# Patient Record
Sex: FEMALE | Race: OTHER | NOT HISPANIC OR LATINO | URBAN - METROPOLITAN AREA
[De-identification: names, ages, dates, MRNs, and addresses within clinical notes are randomized per-mention and may not be internally consistent; named-entity substitution may affect disease eponyms.]

---

## 2018-07-13 ENCOUNTER — OUTPATIENT (OUTPATIENT)
Dept: OUTPATIENT SERVICES | Age: 23
LOS: 1 days | Discharge: HOME | End: 2018-07-13

## 2018-07-13 PROBLEM — Z00.00 ENCOUNTER FOR PREVENTIVE HEALTH EXAMINATION: Status: ACTIVE | Noted: 2018-07-13

## 2018-07-16 ENCOUNTER — APPOINTMENT (OUTPATIENT)
Dept: PEDIATRIC CARDIOLOGY | Facility: CLINIC | Age: 23
End: 2018-07-16
Payer: COMMERCIAL

## 2018-07-16 PROCEDURE — 76820 UMBILICAL ARTERY ECHO: CPT

## 2018-07-16 PROCEDURE — 76825 ECHO EXAM OF FETAL HEART: CPT

## 2018-07-16 PROCEDURE — 99201 OFFICE OUTPATIENT NEW 10 MINUTES: CPT | Mod: 25

## 2018-07-16 PROCEDURE — 93325 DOPPLER ECHO COLOR FLOW MAPG: CPT | Mod: 59

## 2018-07-16 PROCEDURE — 76827 ECHO EXAM OF FETAL HEART: CPT

## 2018-07-16 PROCEDURE — 76821 MIDDLE CEREBRAL ARTERY ECHO: CPT

## 2018-08-01 ENCOUNTER — OUTPATIENT (OUTPATIENT)
Dept: OUTPATIENT SERVICES | Facility: HOSPITAL | Age: 23
LOS: 1 days | End: 2018-08-01

## 2018-08-01 DIAGNOSIS — Z3A.00 WEEKS OF GESTATION OF PREGNANCY NOT SPECIFIED: ICD-10-CM

## 2018-08-01 DIAGNOSIS — O26.899 OTHER SPECIFIED PREGNANCY RELATED CONDITIONS, UNSPECIFIED TRIMESTER: ICD-10-CM

## 2019-04-27 ENCOUNTER — RESULT REVIEW (OUTPATIENT)
Age: 24
End: 2019-04-27

## 2021-10-06 ENCOUNTER — APPOINTMENT (OUTPATIENT)
Dept: ORTHOPEDIC SURGERY | Facility: CLINIC | Age: 26
End: 2021-10-06
Payer: MEDICAID

## 2021-10-06 ENCOUNTER — NON-APPOINTMENT (OUTPATIENT)
Age: 26
End: 2021-10-06

## 2021-10-06 DIAGNOSIS — M17.0 BILATERAL PRIMARY OSTEOARTHRITIS OF KNEE: ICD-10-CM

## 2021-10-06 DIAGNOSIS — M22.41 CHONDROMALACIA PATELLAE, RIGHT KNEE: ICD-10-CM

## 2021-10-06 DIAGNOSIS — M22.42 CHONDROMALACIA PATELLAE, LEFT KNEE: ICD-10-CM

## 2021-10-06 PROCEDURE — 99204 OFFICE O/P NEW MOD 45 MIN: CPT

## 2021-10-06 PROCEDURE — 73564 X-RAY EXAM KNEE 4 OR MORE: CPT | Mod: LT

## 2021-10-06 NOTE — HISTORY OF PRESENT ILLNESS
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  Typically experiences pain when gets out of bed in the morning and standing for more than 30 minutes or sitting for long periods of time.  Reports associated "cracking a lot" denies swelling or locking.  Medical history of hypothyroidism for which patient is on medication

## 2021-10-06 NOTE — PHYSICAL EXAM
[de-identified] : Constitutional:Well nourished , well developed and in no acute distress\par Psychiatric: Alert and oriented to time place and person.Appropriate affect \par Skin:Head, neck, arms and lower extremities:no lesions or discoloration\par HEENT: Normocephalic, EOM intact, Nasal septum midline,\par Respiratory: Unlabored respirations,no audible wheezing ,no tachypnea, no cyanosis\par Cardiovascular: no leg swelling  no ankle edema no JVD, pulse regular\par Vascular: no calf or thigh tenderness, \par Peripheral pulses; intact\par Lymphatics:No groin adenopathy,no lymphedema lower  or upper extremities\par Right and left knee borderline increased Q angle no effusion motion full crepitus ligaments intact tender left knee medial patellar facet [de-identified] : X-rays 4 views right and left knee reveal borderline patella hiral

## 2023-02-07 ENCOUNTER — OUTPATIENT (OUTPATIENT)
Dept: OUTPATIENT SERVICES | Facility: HOSPITAL | Age: 28
LOS: 1 days | End: 2023-02-07
Payer: MEDICAID

## 2023-02-07 DIAGNOSIS — O26.613 LIVER AND BILIARY TRACT DISORDERS IN PREGNANCY, THIRD TRIMESTER: ICD-10-CM

## 2023-02-07 LAB
ANION GAP SERPL CALC-SCNC: 9 MMOL/L — SIGNIFICANT CHANGE UP (ref 5–17)
APTT BLD: 29 SEC — SIGNIFICANT CHANGE UP (ref 27.5–35.5)
BLD GP AB SCN SERPL QL: SIGNIFICANT CHANGE UP
BUN SERPL-MCNC: 11 MG/DL — SIGNIFICANT CHANGE UP (ref 7–18)
CALCIUM SERPL-MCNC: 9 MG/DL — SIGNIFICANT CHANGE UP (ref 8.4–10.5)
CHLORIDE SERPL-SCNC: 104 MMOL/L — SIGNIFICANT CHANGE UP (ref 96–108)
CO2 SERPL-SCNC: 21 MMOL/L — LOW (ref 22–31)
CREAT SERPL-MCNC: 0.64 MG/DL — SIGNIFICANT CHANGE UP (ref 0.5–1.3)
EGFR: 123 ML/MIN/1.73M2 — SIGNIFICANT CHANGE UP
GLUCOSE SERPL-MCNC: 75 MG/DL — SIGNIFICANT CHANGE UP (ref 70–99)
HCT VFR BLD CALC: 35.2 % — SIGNIFICANT CHANGE UP (ref 34.5–45)
HGB BLD-MCNC: 11.3 G/DL — LOW (ref 11.5–15.5)
INR BLD: 1.12 RATIO — SIGNIFICANT CHANGE UP (ref 0.88–1.16)
MCHC RBC-ENTMCNC: 26.2 PG — LOW (ref 27–34)
MCHC RBC-ENTMCNC: 32.1 GM/DL — SIGNIFICANT CHANGE UP (ref 32–36)
MCV RBC AUTO: 81.7 FL — SIGNIFICANT CHANGE UP (ref 80–100)
NRBC # BLD: 0 /100 WBCS — SIGNIFICANT CHANGE UP (ref 0–0)
PLATELET # BLD AUTO: 252 K/UL — SIGNIFICANT CHANGE UP (ref 150–400)
POTASSIUM SERPL-MCNC: 3.9 MMOL/L — SIGNIFICANT CHANGE UP (ref 3.5–5.3)
POTASSIUM SERPL-SCNC: 3.9 MMOL/L — SIGNIFICANT CHANGE UP (ref 3.5–5.3)
PROTHROM AB SERPL-ACNC: 13.4 SEC — SIGNIFICANT CHANGE UP (ref 10.5–13.4)
RBC # BLD: 4.31 M/UL — SIGNIFICANT CHANGE UP (ref 3.8–5.2)
RBC # FLD: 14.3 % — SIGNIFICANT CHANGE UP (ref 10.3–14.5)
SARS-COV-2 RNA SPEC QL NAA+PROBE: SIGNIFICANT CHANGE UP
SODIUM SERPL-SCNC: 134 MMOL/L — LOW (ref 135–145)
WBC # BLD: 6.76 K/UL — SIGNIFICANT CHANGE UP (ref 3.8–10.5)
WBC # FLD AUTO: 6.76 K/UL — SIGNIFICANT CHANGE UP (ref 3.8–10.5)

## 2023-02-08 ENCOUNTER — TRANSCRIPTION ENCOUNTER (OUTPATIENT)
Age: 28
End: 2023-02-08

## 2023-02-08 LAB — T PALLIDUM AB TITR SER: NEGATIVE — SIGNIFICANT CHANGE UP

## 2023-02-09 ENCOUNTER — INPATIENT (INPATIENT)
Facility: HOSPITAL | Age: 28
LOS: 2 days | Discharge: HOME | End: 2023-02-12
Attending: OBSTETRICS & GYNECOLOGY | Admitting: OBSTETRICS & GYNECOLOGY
Payer: MEDICAID

## 2023-02-09 ENCOUNTER — RESULT REVIEW (OUTPATIENT)
Age: 28
End: 2023-02-09

## 2023-02-09 VITALS — HEIGHT: 68 IN | WEIGHT: 203.93 LBS

## 2023-02-09 DIAGNOSIS — O26.613 LIVER AND BILIARY TRACT DISORDERS IN PREGNANCY, THIRD TRIMESTER: ICD-10-CM

## 2023-02-09 LAB
HBV SURFACE AG SERPL QL IA: SIGNIFICANT CHANGE UP
HIV 1 & 2 AB SERPL IA.RAPID: SIGNIFICANT CHANGE UP
HIV 1+2 AB+HIV1 P24 AG SERPL QL IA: SIGNIFICANT CHANGE UP

## 2023-02-09 PROCEDURE — 88307 TISSUE EXAM BY PATHOLOGIST: CPT | Mod: 26

## 2023-02-09 RX ORDER — FERROUS SULFATE 325(65) MG
325 TABLET ORAL DAILY
Refills: 0 | Status: DISCONTINUED | OUTPATIENT
Start: 2023-02-09 | End: 2023-02-12

## 2023-02-09 RX ORDER — SODIUM CHLORIDE 9 MG/ML
1000 INJECTION, SOLUTION INTRAVENOUS
Refills: 0 | Status: DISCONTINUED | OUTPATIENT
Start: 2023-02-09 | End: 2023-02-09

## 2023-02-09 RX ORDER — CEFAZOLIN SODIUM 1 G
2000 VIAL (EA) INJECTION ONCE
Refills: 0 | Status: COMPLETED | OUTPATIENT
Start: 2023-02-09 | End: 2023-02-09

## 2023-02-09 RX ORDER — OXYTOCIN 10 UNIT/ML
333.33 VIAL (ML) INJECTION
Qty: 20 | Refills: 0 | Status: DISCONTINUED | OUTPATIENT
Start: 2023-02-09 | End: 2023-02-12

## 2023-02-09 RX ORDER — ACETAMINOPHEN 500 MG
1000 TABLET ORAL ONCE
Refills: 0 | Status: DISCONTINUED | OUTPATIENT
Start: 2023-02-09 | End: 2023-02-12

## 2023-02-09 RX ORDER — METOCLOPRAMIDE HCL 10 MG
10 TABLET ORAL ONCE
Refills: 0 | Status: DISCONTINUED | OUTPATIENT
Start: 2023-02-09 | End: 2023-02-09

## 2023-02-09 RX ORDER — DIPHENHYDRAMINE HCL 50 MG
25 CAPSULE ORAL EVERY 6 HOURS
Refills: 0 | Status: DISCONTINUED | OUTPATIENT
Start: 2023-02-09 | End: 2023-02-12

## 2023-02-09 RX ORDER — FAMOTIDINE 10 MG/ML
20 INJECTION INTRAVENOUS ONCE
Refills: 0 | Status: COMPLETED | OUTPATIENT
Start: 2023-02-09 | End: 2023-02-09

## 2023-02-09 RX ORDER — MAGNESIUM HYDROXIDE 400 MG/1
30 TABLET, CHEWABLE ORAL
Refills: 0 | Status: DISCONTINUED | OUTPATIENT
Start: 2023-02-09 | End: 2023-02-12

## 2023-02-09 RX ORDER — SODIUM CHLORIDE 9 MG/ML
1000 INJECTION, SOLUTION INTRAVENOUS
Refills: 0 | Status: DISCONTINUED | OUTPATIENT
Start: 2023-02-09 | End: 2023-02-11

## 2023-02-09 RX ORDER — LANOLIN
1 OINTMENT (GRAM) TOPICAL EVERY 6 HOURS
Refills: 0 | Status: DISCONTINUED | OUTPATIENT
Start: 2023-02-09 | End: 2023-02-12

## 2023-02-09 RX ORDER — HEPARIN SODIUM 5000 [USP'U]/ML
5000 INJECTION INTRAVENOUS; SUBCUTANEOUS EVERY 12 HOURS
Refills: 0 | Status: DISCONTINUED | OUTPATIENT
Start: 2023-02-10 | End: 2023-02-12

## 2023-02-09 RX ORDER — ONDANSETRON 8 MG/1
4 TABLET, FILM COATED ORAL EVERY 6 HOURS
Refills: 0 | Status: DISCONTINUED | OUTPATIENT
Start: 2023-02-09 | End: 2023-02-09

## 2023-02-09 RX ORDER — CALCIUM CARBONATE 500(1250)
3 TABLET ORAL EVERY 6 HOURS
Refills: 0 | Status: DISCONTINUED | OUTPATIENT
Start: 2023-02-09 | End: 2023-02-09

## 2023-02-09 RX ORDER — KETOROLAC TROMETHAMINE 30 MG/ML
30 SYRINGE (ML) INJECTION EVERY 6 HOURS
Refills: 0 | Status: DISCONTINUED | OUTPATIENT
Start: 2023-02-09 | End: 2023-02-11

## 2023-02-09 RX ORDER — TETANUS TOXOID, REDUCED DIPHTHERIA TOXOID AND ACELLULAR PERTUSSIS VACCINE, ADSORBED 5; 2.5; 8; 8; 2.5 [IU]/.5ML; [IU]/.5ML; UG/.5ML; UG/.5ML; UG/.5ML
0.5 SUSPENSION INTRAMUSCULAR ONCE
Refills: 0 | Status: DISCONTINUED | OUTPATIENT
Start: 2023-02-09 | End: 2023-02-12

## 2023-02-09 RX ORDER — IBUPROFEN 200 MG
600 TABLET ORAL EVERY 6 HOURS
Refills: 0 | Status: COMPLETED | OUTPATIENT
Start: 2023-02-09 | End: 2024-01-08

## 2023-02-09 RX ORDER — CITRIC ACID/SODIUM CITRATE 300-500 MG
30 SOLUTION, ORAL ORAL ONCE
Refills: 0 | Status: COMPLETED | OUTPATIENT
Start: 2023-02-09 | End: 2023-02-09

## 2023-02-09 RX ORDER — SENNA PLUS 8.6 MG/1
2 TABLET ORAL AT BEDTIME
Refills: 0 | Status: DISCONTINUED | OUTPATIENT
Start: 2023-02-09 | End: 2023-02-09

## 2023-02-09 RX ORDER — MORPHINE SULFATE 50 MG/1
0.2 CAPSULE, EXTENDED RELEASE ORAL ONCE
Refills: 0 | Status: DISCONTINUED | OUTPATIENT
Start: 2023-02-09 | End: 2023-02-12

## 2023-02-09 RX ORDER — ACETAMINOPHEN 500 MG
975 TABLET ORAL
Refills: 0 | Status: DISCONTINUED | OUTPATIENT
Start: 2023-02-09 | End: 2023-02-12

## 2023-02-09 RX ORDER — SIMETHICONE 80 MG/1
80 TABLET, CHEWABLE ORAL EVERY 6 HOURS
Refills: 0 | Status: DISCONTINUED | OUTPATIENT
Start: 2023-02-09 | End: 2023-02-12

## 2023-02-09 RX ORDER — SODIUM CHLORIDE 9 MG/ML
1000 INJECTION, SOLUTION INTRAVENOUS ONCE
Refills: 0 | Status: COMPLETED | OUTPATIENT
Start: 2023-02-09 | End: 2023-02-09

## 2023-02-09 RX ORDER — LEVOTHYROXINE SODIUM 125 MCG
50 TABLET ORAL DAILY
Refills: 0 | Status: DISCONTINUED | OUTPATIENT
Start: 2023-02-10 | End: 2023-02-12

## 2023-02-09 RX ORDER — OXYCODONE HYDROCHLORIDE 5 MG/1
5 TABLET ORAL
Refills: 0 | Status: COMPLETED | OUTPATIENT
Start: 2023-02-09 | End: 2023-02-16

## 2023-02-09 RX ADMIN — Medication 975 MILLIGRAM(S): at 23:30

## 2023-02-09 RX ADMIN — SODIUM CHLORIDE 2000 MILLILITER(S): 9 INJECTION, SOLUTION INTRAVENOUS at 09:59

## 2023-02-09 RX ADMIN — Medication 30 MILLILITER(S): at 10:00

## 2023-02-09 RX ADMIN — Medication 975 MILLIGRAM(S): at 22:27

## 2023-02-09 RX ADMIN — Medication 100 MILLIGRAM(S): at 10:17

## 2023-02-09 RX ADMIN — Medication 1000 MILLIUNIT(S)/MIN: at 17:25

## 2023-02-09 RX ADMIN — Medication 30 MILLIGRAM(S): at 19:16

## 2023-02-09 RX ADMIN — Medication 30 MILLIGRAM(S): at 18:31

## 2023-02-09 RX ADMIN — FAMOTIDINE 20 MILLIGRAM(S): 10 INJECTION INTRAVENOUS at 10:00

## 2023-02-09 NOTE — PROGRESS NOTE ADULT - SUBJECTIVE AND OBJECTIVE BOX
Pt seen at bedside resting comfortably and offers no complaints.  Luna catheter in place draining adequate urine. Pt denies CP,SOB, N/V, dizziness, palpitations or any other complaints.  T(C): 36.9 (02-09-23 @ 20:27), Max: 36.9 (02-09-23 @ 20:27)  HR: 86 (02-09-23 @ 20:27) (74 - 100)  BP: 110/74 (02-09-23 @ 20:27) (97/65 - 121/74)  RR: 18 (02-09-23 @ 20:27) (14 - 20)  SpO2: 97% (02-09-23 @ 20:27) (97% - 100%)    abd: soft/nt, fundus firm, incision C/D/I, dermabond in place   pelvic: minimal lochia  ext: venodynes in place; no calf tenderness             A/p:  28yo POD #0 s/p scheduled repeat c/s @ 37wks for ICP, h/o hypothyroidism, pt stable   - synthroid ordered  -cont close monitor   -cont post op care  - cont luna, monitor ouput   - clear liquid diet, once pt passes gas advance to regular diet   -d/w Dr. Watts

## 2023-02-09 NOTE — PATIENT PROFILE OB - FALL HARM RISK - UNIVERSAL INTERVENTIONS
Bed in lowest position, wheels locked, appropriate side rails in place/Call bell, personal items and telephone in reach/Instruct patient to call for assistance before getting out of bed or chair/Non-slip footwear when patient is out of bed/[place] to call system/Physically safe environment - no spills, clutter or unnecessary equipment/Purposeful Proactive Rounding/Room/bathroom lighting operational, light cord in reach

## 2023-02-09 NOTE — PATIENT PROFILE OB - REASON FOR REFUSAL
Call received from patient's wife Anabelle.  Explained that per RN, due to recent blot clots, patient will be able to have surgery in December and offered her OR date 12/15.  She said they are under the impression that surgery is urgent.  She asked why I am calling them without prior notice to schedule and if Dr. Presley has reviewed patient's imaging.  She was thinking that his review of imaging will confirm that surgery is urgent and needs to be done soon.  Informed her that I will send message to RN and request she call them.   will ff up with PMD

## 2023-02-10 ENCOUNTER — TRANSCRIPTION ENCOUNTER (OUTPATIENT)
Age: 28
End: 2023-02-10

## 2023-02-10 LAB
BASOPHILS # BLD AUTO: 0.04 K/UL — SIGNIFICANT CHANGE UP (ref 0–0.2)
BASOPHILS NFR BLD AUTO: 0.5 % — SIGNIFICANT CHANGE UP (ref 0–2)
EOSINOPHIL # BLD AUTO: 0.03 K/UL — SIGNIFICANT CHANGE UP (ref 0–0.5)
EOSINOPHIL NFR BLD AUTO: 0.4 % — SIGNIFICANT CHANGE UP (ref 0–6)
HCT VFR BLD CALC: 28.6 % — LOW (ref 34.5–45)
HGB BLD-MCNC: 8.9 G/DL — LOW (ref 11.5–15.5)
IMM GRANULOCYTES NFR BLD AUTO: 0.1 % — SIGNIFICANT CHANGE UP (ref 0–0.9)
LYMPHOCYTES # BLD AUTO: 1.83 K/UL — SIGNIFICANT CHANGE UP (ref 1–3.3)
LYMPHOCYTES # BLD AUTO: 25.1 % — SIGNIFICANT CHANGE UP (ref 13–44)
MCHC RBC-ENTMCNC: 26.3 PG — LOW (ref 27–34)
MCHC RBC-ENTMCNC: 31.1 GM/DL — LOW (ref 32–36)
MCV RBC AUTO: 84.4 FL — SIGNIFICANT CHANGE UP (ref 80–100)
MONOCYTES # BLD AUTO: 0.32 K/UL — SIGNIFICANT CHANGE UP (ref 0–0.9)
MONOCYTES NFR BLD AUTO: 4.4 % — SIGNIFICANT CHANGE UP (ref 2–14)
NEUTROPHILS # BLD AUTO: 5.05 K/UL — SIGNIFICANT CHANGE UP (ref 1.8–7.4)
NEUTROPHILS NFR BLD AUTO: 69.5 % — SIGNIFICANT CHANGE UP (ref 43–77)
NRBC # BLD: 0 /100 WBCS — SIGNIFICANT CHANGE UP (ref 0–0)
PLATELET # BLD AUTO: 175 K/UL — SIGNIFICANT CHANGE UP (ref 150–400)
RBC # BLD: 3.39 M/UL — LOW (ref 3.8–5.2)
RBC # FLD: 14.4 % — SIGNIFICANT CHANGE UP (ref 10.3–14.5)
WBC # BLD: 7.28 K/UL — SIGNIFICANT CHANGE UP (ref 3.8–10.5)
WBC # FLD AUTO: 7.28 K/UL — SIGNIFICANT CHANGE UP (ref 3.8–10.5)

## 2023-02-10 RX ORDER — OXYCODONE HYDROCHLORIDE 5 MG/1
5 TABLET ORAL
Refills: 0 | Status: DISCONTINUED | OUTPATIENT
Start: 2023-02-10 | End: 2023-02-12

## 2023-02-10 RX ADMIN — Medication 325 MILLIGRAM(S): at 12:19

## 2023-02-10 RX ADMIN — Medication 975 MILLIGRAM(S): at 11:09

## 2023-02-10 RX ADMIN — Medication 30 MILLIGRAM(S): at 05:45

## 2023-02-10 RX ADMIN — Medication 30 MILLIGRAM(S): at 01:30

## 2023-02-10 RX ADMIN — SIMETHICONE 80 MILLIGRAM(S): 80 TABLET, CHEWABLE ORAL at 05:35

## 2023-02-10 RX ADMIN — Medication 30 MILLIGRAM(S): at 06:00

## 2023-02-10 RX ADMIN — OXYCODONE HYDROCHLORIDE 5 MILLIGRAM(S): 5 TABLET ORAL at 15:47

## 2023-02-10 RX ADMIN — Medication 975 MILLIGRAM(S): at 23:10

## 2023-02-10 RX ADMIN — OXYCODONE HYDROCHLORIDE 5 MILLIGRAM(S): 5 TABLET ORAL at 23:59

## 2023-02-10 RX ADMIN — SIMETHICONE 80 MILLIGRAM(S): 80 TABLET, CHEWABLE ORAL at 18:57

## 2023-02-10 RX ADMIN — Medication 30 MILLIGRAM(S): at 18:51

## 2023-02-10 RX ADMIN — Medication 30 MILLIGRAM(S): at 19:11

## 2023-02-10 RX ADMIN — Medication 1 TABLET(S): at 12:19

## 2023-02-10 RX ADMIN — Medication 30 MILLIGRAM(S): at 01:39

## 2023-02-10 RX ADMIN — HEPARIN SODIUM 5000 UNIT(S): 5000 INJECTION INTRAVENOUS; SUBCUTANEOUS at 18:53

## 2023-02-10 RX ADMIN — OXYCODONE HYDROCHLORIDE 5 MILLIGRAM(S): 5 TABLET ORAL at 15:17

## 2023-02-10 RX ADMIN — Medication 975 MILLIGRAM(S): at 11:39

## 2023-02-10 RX ADMIN — Medication 50 MICROGRAM(S): at 05:45

## 2023-02-10 RX ADMIN — Medication 975 MILLIGRAM(S): at 22:04

## 2023-02-10 RX ADMIN — SIMETHICONE 80 MILLIGRAM(S): 80 TABLET, CHEWABLE ORAL at 13:00

## 2023-02-10 RX ADMIN — Medication 30 MILLIGRAM(S): at 12:18

## 2023-02-10 RX ADMIN — HEPARIN SODIUM 5000 UNIT(S): 5000 INJECTION INTRAVENOUS; SUBCUTANEOUS at 05:36

## 2023-02-10 RX ADMIN — Medication 30 MILLIGRAM(S): at 12:48

## 2023-02-10 NOTE — DISCHARGE NOTE OB - MEDICATION SUMMARY - MEDICATIONS TO TAKE
I will START or STAY ON the medications listed below when I get home from the hospital:    acetaminophen 325 mg oral tablet  -- 3 tab(s) by mouth every 6 hours   -- Indication: For as needed for pain    ibuprofen 600 mg oral tablet  -- 1 tab(s) by mouth every 6 hours  -- Indication: For as needed for pain    Prenatal Multivitamins with Folic Acid 1 mg oral tablet  -- 1 tab(s) by mouth once a day  -- Indication: For breastfeeding    Colace 100 mg oral capsule  -- 1 cap(s) by mouth once a day   -- Medication should be taken with plenty of water.    -- Indication: For Stool softener/constipation    simethicone 80 mg oral tablet, chewable  -- 1 tab(s) by mouth every 6 hours  -- Indication: For Gas discomfort    levothyroxine 50 mcg (0.05 mg) oral tablet  -- 1 tab(s) by mouth once a day  -- Indication: For hypothyroidism

## 2023-02-10 NOTE — DISCHARGE NOTE OB - PATIENT PORTAL LINK FT
You can access the FollowMyHealth Patient Portal offered by [facility] by registering at the following website: [email or web address]. By joining [facility]’s FollowMyHealth portal, you will also be able to view your health information using other applications (apps) compatible with our system.

## 2023-02-10 NOTE — DISCHARGE NOTE OB - CARE PROVIDER_API CALL
Shawnee Man)  Obstetrics and Gynecology  [address], [address]  [place] [address]  Phone: [phone]  Fax: [phone]  Established Patient  Follow Up Time: 1 week

## 2023-02-10 NOTE — DISCHARGE NOTE OB - NS MD DC FALL RISK RISK
For information on Fall & Injury Prevention, visit: [email or web address] OR  [email or web address] OR  https://www.cdc.gov/steadi/patient.html

## 2023-02-10 NOTE — DISCHARGE NOTE OB - PLAN OF CARE
iron supplement  vitamin c no sex nothing in vagina no heavy lifting no pushing no straining no strenuous activities  pain medication as needed; stool softener; dulcolax as needed if constipated  walk for exercise: helps recovery   continue prenatal vitamins daily especially whole course of breastfeeding  see your OB in the office for follow up post partum check call the office set up appointment in 1-2weeks  clean wound daily with soap and water; please note wound for redness or swelling; if noted go to the office right away so the doctor can evaluate the wound for possible wound infection

## 2023-02-10 NOTE — PROGRESS NOTE ADULT - SUBJECTIVE AND OBJECTIVE BOX
PA NOTE:  pod#1 s/p sched rpt cs    in the room  pt doing well    Patient seen at bedisde resting comfortably offers no new complaints. + Ambulation, voiding without difficulty, + flatus; tolerating regular diet. both breastfeeding and bottle feeding. Denies HA, CP, SOB, N/V/D,  no bm; dizziness, palpitations, worsening abdominal pain, worsening vaginal bleeding, or any other concerns.     Vital Signs Last 24 Hrs  T(C): 36.7 (10 Feb 2023 11:00), Max: 36.9 (2023 20:27)  T(F): 98 (10 Feb 2023 11:00), Max: 98.4 (2023 20:27)  HR: 99 (10 Feb 2023 11:) (74 - 100)  BP: 112/76 (10 Feb 2023 11:00) (97/65 - 121/74)  BP(mean): 77 (2023 20:00) (63 - 102)  RR: 17 (10 Feb 2023 11:) (14 - 20)  SpO2: 98% (10 Feb 2023 11:00) (96% - 100%)    Parameters below as of 10 Feb 2023 11:00  Patient On (Oxygen Delivery Method): room air        Gen: A&O x 3, NAD  Chest: CTABL  Cardiac: S1+S2+ RRR  Breast: Soft, nontender, nonengorged  Abdomen: +BS; soft; Nontender, nondistended, dermabond intact no erythema/edema   Gyn: Minimal lochia  Extremities: Nontender, DTRS 2+, no worsening edema    CBC Full  -  ( 10 Feb 2023 06:30 )  WBC Count : 7.28 K/uL  RBC Count : 3.39 M/uL  Hemoglobin : 8.9 g/dL  Hematocrit : 28.6 %  Platelet Count - Automated : 175 K/uL  Mean Cell Volume : 84.4 fl  Mean Cell Hemoglobin : 26.3 pg  Mean Cell Hemoglobin Concentration : 31.1 gm/dL  Auto Neutrophil # : 5.05 K/uL  Auto Lymphocyte # : 1.83 K/uL  Auto Monocyte # : 0.32 K/uL  Auto Eosinophil # : 0.03 K/uL  Auto Basophil # : 0.04 K/uL  Auto Neutrophil % : 69.5 %  Auto Lymphocyte % : 25.1 %  Auto Monocyte % : 4.4 %  Auto Eosinophil % : 0.4 %  Auto Basophil % : 0.5 %

## 2023-02-10 NOTE — DISCHARGE NOTE OB - CARE PLAN
1 Principal Discharge DX:	 delivery delivered  Assessment and plan of treatment:	no sex nothing in vagina no heavy lifting no pushing no straining no strenuous activities  pain medication as needed; stool softener; dulcolax as needed if constipated  walk for exercise: helps recovery   continue prenatal vitamins daily especially whole course of breastfeeding  see your OB in the office for follow up post partum check call the office set up appointment in 1-2weeks  clean wound daily with soap and water; please note wound for redness or swelling; if noted go to the office right away so the doctor can evaluate the wound for possible wound infection  Secondary Diagnosis:	Postoperative anemia due to acute blood loss  Assessment and plan of treatment:	iron supplement  vitamin c

## 2023-02-10 NOTE — DISCHARGE NOTE OB - NURSING SECTION COMPLETE
Daily Note     Today's date: 2022  Patient name: Altaf Ashton  : 1962  MRN: [id number]  Referring provider: Natalie Basurto DO  Dx:   Encounter Diagnosis     ICD-10-CM    1  Shoulder impingement, left  M75 [id number]        Start Time: 714  Stop Time: 756  Total time in clinic (min): 42 minutes    Subjective: pt reports that her shoulder is doing well  Objective: See treatment diary below      Assessment: Tolerated treatment well  Pt completed all exercises well with minimal complaints of discomfort  Pt reports no pain with any motion following manual therapy  Pt instructed to continue HEP as directed  We will plan to RE NV  Patient would benefit from continued PT      Plan: Continue per plan of care        Precautions: MVP, anxiety      Manuals       L shoulder PROM  RK RK RK RK RK RK RK      IASTM L shoulder    posterior shoulder RK anterior/posterior shoulder RK anterior/posterior shoulder RK RK anterior/ posterior shoulder       L shoulder jt mobs    Post and inf RK Post and inf RK        Self massage      infraspinatus RK infraspinatus RK      Neuro Re-Ed             scap retract 5"x10 5"x10           No monies             Band rows  rtb 2x10 3" gtb 2x10 3" btb 2x10 3" btb 2x10 3" btb 2x10 3" btb 2x10 3"      Band ext  rtb 2x10 3" gtb 2x10 3" btb 2x10 3" btb 2x10 3" btb 2x10 3" btb 2x10 3"      Band ER  rtb 2x10  gtb 2x10 btb 2x10 btb 2x10  btb 2x10 btb 2x10      Band IR  rtb 2x10  gtb 2x10  btb 2x10 btb 2x10  btb 2x10 btb 2x10                   Ther Ex             pulleys     5' 5' 5'      Wall slides x10  x20 10"x10         UT stretch 30"x3            Cane flexion  x10 2x10 supine and seated 2x10 AROM 2x10 AROM 2x10 AROM #1 supine and seated 2x10 AROM #1 flex       Cane abd  x10 2x10    2x10 AROM #1 scap      Cane ER  x10           Cane ext     2x10        SL ER      2x10 #1 2x10 active release      Ther Activity                                       Gait Training                                       Modalities Patient/Caregiver provided printed discharge information.

## 2023-02-10 NOTE — LACTATION INITIAL EVALUATION - LACTATION INTERVENTIONS
Breastfeeding on cue 8-12X/24 hours with diaper count to assess for adequate intake, safe skin to skin and rooming-in encouraged./initiate/review safe skin-to-skin/initiate/review techniques for position and latch/reviewed components of an effective feeding and at least 8 effective feedings per day required/reviewed importance of monitoring infant diapers, the breastfeeding log, and minimum output each day/reviewed risks of unnecessary formula supplementation/reviewed benefits and recommendations for rooming in/reviewed feeding on demand/by cue at least 8 times a day/reviewed indications of inadequate milk transfer that would require supplementation

## 2023-02-11 LAB
BASOPHILS # BLD AUTO: 0.02 K/UL — SIGNIFICANT CHANGE UP (ref 0–0.2)
BASOPHILS NFR BLD AUTO: 0.3 % — SIGNIFICANT CHANGE UP (ref 0–2)
EOSINOPHIL # BLD AUTO: 0.05 K/UL — SIGNIFICANT CHANGE UP (ref 0–0.5)
EOSINOPHIL NFR BLD AUTO: 0.6 % — SIGNIFICANT CHANGE UP (ref 0–6)
HCT VFR BLD CALC: 28.4 % — LOW (ref 34.5–45)
HGB BLD-MCNC: 9 G/DL — LOW (ref 11.5–15.5)
IMM GRANULOCYTES NFR BLD AUTO: 0.4 % — SIGNIFICANT CHANGE UP (ref 0–0.9)
LYMPHOCYTES # BLD AUTO: 1.99 K/UL — SIGNIFICANT CHANGE UP (ref 1–3.3)
LYMPHOCYTES # BLD AUTO: 25.7 % — SIGNIFICANT CHANGE UP (ref 13–44)
MCHC RBC-ENTMCNC: 26.8 PG — LOW (ref 27–34)
MCHC RBC-ENTMCNC: 31.7 GM/DL — LOW (ref 32–36)
MCV RBC AUTO: 84.5 FL — SIGNIFICANT CHANGE UP (ref 80–100)
MONOCYTES # BLD AUTO: 0.38 K/UL — SIGNIFICANT CHANGE UP (ref 0–0.9)
MONOCYTES NFR BLD AUTO: 4.9 % — SIGNIFICANT CHANGE UP (ref 2–14)
NEUTROPHILS # BLD AUTO: 5.28 K/UL — SIGNIFICANT CHANGE UP (ref 1.8–7.4)
NEUTROPHILS NFR BLD AUTO: 68.1 % — SIGNIFICANT CHANGE UP (ref 43–77)
NRBC # BLD: 0 /100 WBCS — SIGNIFICANT CHANGE UP (ref 0–0)
PLATELET # BLD AUTO: 206 K/UL — SIGNIFICANT CHANGE UP (ref 150–400)
RBC # BLD: 3.36 M/UL — LOW (ref 3.8–5.2)
RBC # FLD: 14.6 % — HIGH (ref 10.3–14.5)
WBC # BLD: 7.75 K/UL — SIGNIFICANT CHANGE UP (ref 3.8–10.5)
WBC # FLD AUTO: 7.75 K/UL — SIGNIFICANT CHANGE UP (ref 3.8–10.5)

## 2023-02-11 RX ORDER — IBUPROFEN 200 MG
600 TABLET ORAL EVERY 6 HOURS
Refills: 0 | Status: DISCONTINUED | OUTPATIENT
Start: 2023-02-11 | End: 2023-02-12

## 2023-02-11 RX ADMIN — HEPARIN SODIUM 5000 UNIT(S): 5000 INJECTION INTRAVENOUS; SUBCUTANEOUS at 18:03

## 2023-02-11 RX ADMIN — Medication 600 MILLIGRAM(S): at 18:33

## 2023-02-11 RX ADMIN — SIMETHICONE 80 MILLIGRAM(S): 80 TABLET, CHEWABLE ORAL at 12:38

## 2023-02-11 RX ADMIN — Medication 1 TABLET(S): at 12:36

## 2023-02-11 RX ADMIN — MAGNESIUM HYDROXIDE 30 MILLILITER(S): 400 TABLET, CHEWABLE ORAL at 00:09

## 2023-02-11 RX ADMIN — HEPARIN SODIUM 5000 UNIT(S): 5000 INJECTION INTRAVENOUS; SUBCUTANEOUS at 05:54

## 2023-02-11 RX ADMIN — Medication 600 MILLIGRAM(S): at 18:03

## 2023-02-11 RX ADMIN — Medication 975 MILLIGRAM(S): at 06:15

## 2023-02-11 RX ADMIN — SIMETHICONE 80 MILLIGRAM(S): 80 TABLET, CHEWABLE ORAL at 18:06

## 2023-02-11 RX ADMIN — SIMETHICONE 80 MILLIGRAM(S): 80 TABLET, CHEWABLE ORAL at 05:55

## 2023-02-11 RX ADMIN — Medication 50 MICROGRAM(S): at 05:55

## 2023-02-11 RX ADMIN — Medication 325 MILLIGRAM(S): at 12:37

## 2023-02-11 RX ADMIN — OXYCODONE HYDROCHLORIDE 5 MILLIGRAM(S): 5 TABLET ORAL at 00:00

## 2023-02-11 RX ADMIN — Medication 975 MILLIGRAM(S): at 07:29

## 2023-02-11 NOTE — PROGRESS NOTE ADULT - SUBJECTIVE AND OBJECTIVE BOX
PA NOTE:  pod#2 s/p rpt cs   in the room  pt doing well   pain well controlled    Patient seen at bedside resting comfortably offers no new complaints. + Ambulation, + void without difficulty, + flatus; tolerating regular diet. both breastfeeding and bottle feeding. Denies HA, CP, SOB, N/V/D,  no bm; dizziness, palpitations, worsening abdominal pain, worsening vaginal bleeding, or any other concerns.     Vital Signs Last 24 Hrs  T(C): 36.8 (2023 06:11), Max: 36.9 (10 Feb 2023 18:38)  T(F): 98.2 (2023 06:11), Max: 98.4 (10 Feb 2023 18:38)  HR: 89 (2023 06:11) (89 - 99)  BP: 134/83 (2023 06:11) (110/72 - 134/83)  BP(mean): --  RR: 17 (2023 06:11) (17 - 18)  SpO2: 99% (2023 06:11) (98% - 99%)    Parameters below as of 2023 06:11  Patient On (Oxygen Delivery Method): room air      CAPILLARY BLOOD GLUCOSE          Gen: A&O x 3, NAD  Chest: CTABL  Cardiac: S1+S2+ RRR  Breast: Soft, nontender, nonengorged  Abdomen: +BS; soft; Nontender, nondistended,  dermabond intact no erythema/edema  Gyn: Minimal lochia  Extremities: Nontender, DTRS 2+, no worsening edema

## 2023-02-11 NOTE — PROGRESS NOTE ADULT - ASSESSMENT
PA NOTE:  pod#2 s/p rpt cs   in the room  pt doing well   pain well controlled                        9.0    7.75  )-----------( 206      ( 2023 06:15 )             28.4   -anemia post cs- vss asymp iron supplement  - dc plan in am  -cont post op care  - diet reg
PA NOTE:  pod#1 s/p sched rpt cs    in the room  pt doing well                        8.9    7.28  )-----------( 175      ( 10 Feb 2023 06:30 )             28.6   -acute blood loss anemia post cs- iron /pnv supplements  -rpt cbc in am   -diet-reg

## 2023-02-12 VITALS
SYSTOLIC BLOOD PRESSURE: 110 MMHG | OXYGEN SATURATION: 97 % | TEMPERATURE: 98 F | HEART RATE: 89 BPM | RESPIRATION RATE: 17 BRPM | DIASTOLIC BLOOD PRESSURE: 71 MMHG

## 2023-02-12 PROCEDURE — 36415 COLL VENOUS BLD VENIPUNCTURE: CPT

## 2023-02-12 PROCEDURE — 59050 FETAL MONITOR W/REPORT: CPT

## 2023-02-12 PROCEDURE — 86703 HIV-1/HIV-2 1 RESULT ANTBDY: CPT

## 2023-02-12 PROCEDURE — 85025 COMPLETE CBC W/AUTO DIFF WBC: CPT

## 2023-02-12 PROCEDURE — 88307 TISSUE EXAM BY PATHOLOGIST: CPT

## 2023-02-12 PROCEDURE — 87389 HIV-1 AG W/HIV-1&-2 AB AG IA: CPT

## 2023-02-12 PROCEDURE — 87340 HEPATITIS B SURFACE AG IA: CPT

## 2023-02-12 RX ORDER — SIMETHICONE 80 MG/1
1 TABLET, CHEWABLE ORAL
Qty: 30 | Refills: 0
Start: 2023-02-12

## 2023-02-12 RX ORDER — DOCUSATE SODIUM 100 MG
1 CAPSULE ORAL
Qty: 30 | Refills: 0
Start: 2023-02-12

## 2023-02-12 RX ORDER — ACETAMINOPHEN 500 MG
3 TABLET ORAL
Qty: 30 | Refills: 0
Start: 2023-02-12

## 2023-02-12 RX ORDER — LEVOTHYROXINE SODIUM 125 MCG
1 TABLET ORAL
Qty: 0 | Refills: 0 | DISCHARGE
Start: 2023-02-12

## 2023-02-12 RX ORDER — IBUPROFEN 200 MG
1 TABLET ORAL
Qty: 30 | Refills: 0
Start: 2023-02-12

## 2023-02-12 RX ADMIN — Medication 975 MILLIGRAM(S): at 09:17

## 2023-02-12 RX ADMIN — Medication 600 MILLIGRAM(S): at 04:06

## 2023-02-12 RX ADMIN — Medication 975 MILLIGRAM(S): at 00:35

## 2023-02-12 RX ADMIN — Medication 975 MILLIGRAM(S): at 01:05

## 2023-02-12 RX ADMIN — SIMETHICONE 80 MILLIGRAM(S): 80 TABLET, CHEWABLE ORAL at 00:35

## 2023-02-12 RX ADMIN — Medication 600 MILLIGRAM(S): at 04:36

## 2023-02-12 RX ADMIN — Medication 50 MICROGRAM(S): at 06:21

## 2023-02-12 NOTE — PROGRESS NOTE ADULT - SUBJECTIVE AND OBJECTIVE BOX
Patient seen resting comfortably.  No new complaints.  Denies HA, CP, SOB, N/V/D, dizziness, palpitations, worsening abdominal pain, worsening vaginal bleeding, or any other concerns. + Ambulation, + void without difficulty, + flatus and tolerating regular diet. Attempting breastfeeding.     Vital Signs Last 24 Hrs  T(C): 36.9 (2023 06:22), Max: 37 (2023 18:05)  T(F): 98.4 (2023 06:22), Max: 98.6 (2023 18:05)  HR: 89 (2023 06:22) (89 - 93)  BP: 110/71 (2023 06:22) (110/71 - 120/78)  RR: 17 (2023 06:22) (16 - 17)  SpO2: 97% (2023 06:22) (97% - 98%)    Parameters below as of 2023 06:22  Patient On (Oxygen Delivery Method): room air        Gen: A&O x 3, NAD  Chest: CTABL  Cardiac: S1+S2+ RRR  Breast: Soft, nontender, nonengorged  Abdomen: Nontender, nondistended, +BS, Incision clean dry and intact with dermabond  Gyn: Minimal bleeding  Extremities: Nontender, DTRS 2+, no worsening edema                          9.0    7.75  )-----------( 206      ( 2023 06:15 )             28.4         A/P:  Patient is a 27 year old P2 s/p  section, POD #3.  Stable for discharge .    -Discharge home with instructions given  -Ambulate daily  -Pain management as needed  -Encourage breastfeeding  -Follow up in office in 2 weeks for incision check    Attending aware

## 2023-03-06 LAB — SURGICAL PATHOLOGY STUDY: SIGNIFICANT CHANGE UP

## 2023-06-01 PROCEDURE — G0463: CPT

## 2023-06-01 PROCEDURE — 86923 COMPATIBILITY TEST ELECTRIC: CPT

## 2024-09-16 ENCOUNTER — NON-APPOINTMENT (OUTPATIENT)
Age: 29
End: 2024-09-16

## 2024-09-17 ENCOUNTER — APPOINTMENT (OUTPATIENT)
Dept: NEUROLOGY | Facility: CLINIC | Age: 29
End: 2024-09-17
Payer: MEDICAID

## 2024-09-17 VITALS
DIASTOLIC BLOOD PRESSURE: 79 MMHG | SYSTOLIC BLOOD PRESSURE: 113 MMHG | HEART RATE: 74 BPM | BODY MASS INDEX: 28.19 KG/M2 | HEIGHT: 68 IN | WEIGHT: 186 LBS

## 2024-09-17 DIAGNOSIS — R25.3 FASCICULATION: ICD-10-CM

## 2024-09-17 DIAGNOSIS — R25.1 TREMOR, UNSPECIFIED: ICD-10-CM

## 2024-09-17 PROCEDURE — 99205 OFFICE O/P NEW HI 60 MIN: CPT

## 2024-09-19 NOTE — HISTORY OF PRESENT ILLNESS
[FreeTextEntry1] : 28-year-old female presents with complaints of muscle twitching in her fingers and both arms and muscle twitching in her legs. States she can see her skin moving in the thighs and the arms. This happens for 10-15 seconds. This began 3 months ago. Twitching tends to happen when she is sitting and not doing anything. Reports her legs feel heavy and is fatigued upon waking. Notes her twitching is worse when she is anxious.   She does not currently have a psychiatrist but is looking for one.   Reports post-partum anxiety continues to be present. 1x week she has worse anxiety.   Reports pulsing sensation in her ear and head. Had an unremarkable brain MRI and saw ENT for dizziness.   nonmotor -Snores sometimes +costipation -no urine symptoms  +daytime somnolence    Current medication levothyroxine   past medications meclizine - drowsy  PMHx: post partum anxiety and depression.  Social: Had 1 child 1 year ago

## 2024-09-19 NOTE — DISCUSSION/SUMMARY
[FreeTextEntry1] : 28 year old female presents with complaints of muscle twitching. There are no parkisonian symptoms on exam, overall non focal. She has untreated post partum anxiety which continues to effect her. Her reports of hand tremors appear to be enhanced physiologic tremor which may occur in the setting of untreated anxiety.   Patient was counseled on the following recommendations -Refer to neuromuscular specialists for reports of muscle twitching -f/u with ENT regarding dizziness -Establish care with psychiatry as advised by PCP -Obtain brain MRI to r/o structural abnormalities  f/u as needed   -

## 2024-09-19 NOTE — PHYSICAL EXAM
[General Appearance - Alert] : alert [Oriented To Time, Place, And Person] : oriented to person, place, and time [Cranial Nerves Oculomotor (III)] : extraocular motion intact [FreeTextEntry1] : no facial masking Vertical eye movements intact without square wave jerks normal speech 0 Rigidity of neck rotation 0 Rigidity of limbs present with contralateral activation  0 Resting tremor 0 Action tremor +1 Postural tremor distractable 0 Bradykinesia with finger tapping, hand supination/pronation, foot tapping, foot stomping. No dysmetria with finger to nose Gait: able to stand with hands crossed and without assistance normal posture normal gait and pull test